# Patient Record
Sex: FEMALE | HISPANIC OR LATINO | Employment: UNEMPLOYED | URBAN - METROPOLITAN AREA
[De-identification: names, ages, dates, MRNs, and addresses within clinical notes are randomized per-mention and may not be internally consistent; named-entity substitution may affect disease eponyms.]

---

## 2024-05-09 ENCOUNTER — PRE-ADMISSION TESTING (OUTPATIENT)
Dept: ADMISSIONS | Facility: MEDICAL CENTER | Age: 54
End: 2024-05-09
Attending: SPECIALIST
Payer: COMMERCIAL

## 2024-05-09 NOTE — OR NURSING
Pt understands to not take supplements, herbals, or multivitamins for 1 week prior to procedure, and if she needs to take anything for pain OTC, Tylenol or Extra Strength Tylenol is the only NSAID to take.  Please do HCG DOS.

## 2024-05-13 ENCOUNTER — ANESTHESIA (OUTPATIENT)
Dept: SURGERY | Facility: MEDICAL CENTER | Age: 54
End: 2024-05-13
Payer: COMMERCIAL

## 2024-05-13 ENCOUNTER — HOSPITAL ENCOUNTER (OUTPATIENT)
Facility: MEDICAL CENTER | Age: 54
End: 2024-05-13
Attending: SPECIALIST | Admitting: SPECIALIST
Payer: COMMERCIAL

## 2024-05-13 ENCOUNTER — ANESTHESIA EVENT (OUTPATIENT)
Dept: SURGERY | Facility: MEDICAL CENTER | Age: 54
End: 2024-05-13
Payer: COMMERCIAL

## 2024-05-13 VITALS
TEMPERATURE: 96.9 F | RESPIRATION RATE: 13 BRPM | BODY MASS INDEX: 30.55 KG/M2 | DIASTOLIC BLOOD PRESSURE: 74 MMHG | HEART RATE: 72 BPM | WEIGHT: 161.82 LBS | SYSTOLIC BLOOD PRESSURE: 130 MMHG | OXYGEN SATURATION: 93 % | HEIGHT: 61 IN

## 2024-05-13 DIAGNOSIS — G89.18 POSTOPERATIVE PAIN: ICD-10-CM

## 2024-05-13 PROBLEM — N84.0 ABNORMAL UTERINE BLEEDING DUE TO ENDOMETRIAL POLYP: Status: ACTIVE | Noted: 2024-05-13

## 2024-05-13 PROBLEM — N93.9 ABNORMAL UTERINE BLEEDING DUE TO ENDOMETRIAL POLYP: Status: ACTIVE | Noted: 2024-05-13

## 2024-05-13 PROBLEM — N92.0 MENORRHAGIA: Status: ACTIVE | Noted: 2024-05-13

## 2024-05-13 LAB
HCG UR QL: NEGATIVE
PATHOLOGY CONSULT NOTE: NORMAL

## 2024-05-13 RX ORDER — DEXAMETHASONE SODIUM PHOSPHATE 4 MG/ML
INJECTION, SOLUTION INTRA-ARTICULAR; INTRALESIONAL; INTRAMUSCULAR; INTRAVENOUS; SOFT TISSUE PRN
Status: DISCONTINUED | OUTPATIENT
Start: 2024-05-13 | End: 2024-05-13 | Stop reason: SURG

## 2024-05-13 RX ORDER — OXYCODONE HCL 5 MG/5 ML
5 SOLUTION, ORAL ORAL
Status: COMPLETED | OUTPATIENT
Start: 2024-05-13 | End: 2024-05-13

## 2024-05-13 RX ORDER — OXYCODONE HCL 5 MG/5 ML
10 SOLUTION, ORAL ORAL
Status: COMPLETED | OUTPATIENT
Start: 2024-05-13 | End: 2024-05-13

## 2024-05-13 RX ORDER — LIDOCAINE HYDROCHLORIDE 20 MG/ML
INJECTION, SOLUTION EPIDURAL; INFILTRATION; INTRACAUDAL; PERINEURAL PRN
Status: DISCONTINUED | OUTPATIENT
Start: 2024-05-13 | End: 2024-05-13 | Stop reason: SURG

## 2024-05-13 RX ORDER — ONDANSETRON 2 MG/ML
4 INJECTION INTRAMUSCULAR; INTRAVENOUS
Status: COMPLETED | OUTPATIENT
Start: 2024-05-13 | End: 2024-05-13

## 2024-05-13 RX ORDER — CEFAZOLIN SODIUM 1 G/3ML
INJECTION, POWDER, FOR SOLUTION INTRAMUSCULAR; INTRAVENOUS PRN
Status: DISCONTINUED | OUTPATIENT
Start: 2024-05-13 | End: 2024-05-13 | Stop reason: SURG

## 2024-05-13 RX ORDER — DIPHENHYDRAMINE HYDROCHLORIDE 50 MG/ML
12.5 INJECTION INTRAMUSCULAR; INTRAVENOUS
Status: DISCONTINUED | OUTPATIENT
Start: 2024-05-13 | End: 2024-05-13 | Stop reason: HOSPADM

## 2024-05-13 RX ORDER — SODIUM CHLORIDE, SODIUM LACTATE, POTASSIUM CHLORIDE, CALCIUM CHLORIDE 600; 310; 30; 20 MG/100ML; MG/100ML; MG/100ML; MG/100ML
INJECTION, SOLUTION INTRAVENOUS CONTINUOUS
Status: ACTIVE | OUTPATIENT
Start: 2024-05-13 | End: 2024-05-13

## 2024-05-13 RX ORDER — MEPERIDINE HYDROCHLORIDE 25 MG/ML
6.25 INJECTION INTRAMUSCULAR; INTRAVENOUS; SUBCUTANEOUS
Status: DISCONTINUED | OUTPATIENT
Start: 2024-05-13 | End: 2024-05-13 | Stop reason: HOSPADM

## 2024-05-13 RX ORDER — OXYCODONE HYDROCHLORIDE AND ACETAMINOPHEN 5; 325 MG/1; MG/1
1 TABLET ORAL EVERY 6 HOURS PRN
Qty: 28 TABLET | Refills: 0 | Status: SHIPPED | OUTPATIENT
Start: 2024-05-13 | End: 2024-05-20

## 2024-05-13 RX ORDER — EPINEPHRINE 1 MG/ML(1)
AMPUL (ML) INJECTION
Status: DISCONTINUED
Start: 2024-05-13 | End: 2024-05-13 | Stop reason: HOSPADM

## 2024-05-13 RX ORDER — HALOPERIDOL 5 MG/ML
1 INJECTION INTRAMUSCULAR
Status: DISCONTINUED | OUTPATIENT
Start: 2024-05-13 | End: 2024-05-13 | Stop reason: HOSPADM

## 2024-05-13 RX ORDER — ONDANSETRON 2 MG/ML
INJECTION INTRAMUSCULAR; INTRAVENOUS PRN
Status: DISCONTINUED | OUTPATIENT
Start: 2024-05-13 | End: 2024-05-13 | Stop reason: SURG

## 2024-05-13 RX ORDER — KETOROLAC TROMETHAMINE 15 MG/ML
INJECTION, SOLUTION INTRAMUSCULAR; INTRAVENOUS PRN
Status: DISCONTINUED | OUTPATIENT
Start: 2024-05-13 | End: 2024-05-13 | Stop reason: SURG

## 2024-05-13 RX ORDER — EPHEDRINE SULFATE 50 MG/ML
INJECTION, SOLUTION INTRAVENOUS PRN
Status: DISCONTINUED | OUTPATIENT
Start: 2024-05-13 | End: 2024-05-13 | Stop reason: SURG

## 2024-05-13 RX ORDER — IBUPROFEN 800 MG/1
800 TABLET ORAL EVERY 8 HOURS PRN
Qty: 30 TABLET | Refills: 0 | Status: SHIPPED | OUTPATIENT
Start: 2024-05-13

## 2024-05-13 RX ORDER — BUPIVACAINE HYDROCHLORIDE 2.5 MG/ML
INJECTION, SOLUTION EPIDURAL; INFILTRATION; INTRACAUDAL
Status: DISCONTINUED
Start: 2024-05-13 | End: 2024-05-13 | Stop reason: HOSPADM

## 2024-05-13 RX ORDER — SODIUM CHLORIDE, SODIUM LACTATE, POTASSIUM CHLORIDE, CALCIUM CHLORIDE 600; 310; 30; 20 MG/100ML; MG/100ML; MG/100ML; MG/100ML
INJECTION, SOLUTION INTRAVENOUS CONTINUOUS
Status: DISCONTINUED | OUTPATIENT
Start: 2024-05-13 | End: 2024-05-13 | Stop reason: HOSPADM

## 2024-05-13 RX ADMIN — DEXAMETHASONE SODIUM PHOSPHATE 4 MG: 4 INJECTION INTRA-ARTICULAR; INTRALESIONAL; INTRAMUSCULAR; INTRAVENOUS; SOFT TISSUE at 14:58

## 2024-05-13 RX ADMIN — CEFAZOLIN 2 G: 1 INJECTION, POWDER, FOR SOLUTION INTRAMUSCULAR; INTRAVENOUS at 13:58

## 2024-05-13 RX ADMIN — OXYCODONE HYDROCHLORIDE 5 MG: 5 SOLUTION ORAL at 15:45

## 2024-05-13 RX ADMIN — EPHEDRINE SULFATE 10 MG: 50 INJECTION, SOLUTION INTRAVENOUS at 14:23

## 2024-05-13 RX ADMIN — FENTANYL CITRATE 50 MCG: 50 INJECTION, SOLUTION INTRAMUSCULAR; INTRAVENOUS at 15:01

## 2024-05-13 RX ADMIN — LIDOCAINE HYDROCHLORIDE 100 MG: 20 INJECTION, SOLUTION EPIDURAL; INFILTRATION; INTRACAUDAL at 13:52

## 2024-05-13 RX ADMIN — ONDANSETRON 4 MG: 2 INJECTION INTRAMUSCULAR; INTRAVENOUS at 14:58

## 2024-05-13 RX ADMIN — SODIUM CHLORIDE, POTASSIUM CHLORIDE, SODIUM LACTATE AND CALCIUM CHLORIDE: 600; 310; 30; 20 INJECTION, SOLUTION INTRAVENOUS at 10:43

## 2024-05-13 RX ADMIN — ONDANSETRON 4 MG: 2 INJECTION INTRAMUSCULAR; INTRAVENOUS at 15:02

## 2024-05-13 RX ADMIN — PROPOFOL 200 MG: 10 INJECTION, EMULSION INTRAVENOUS at 13:52

## 2024-05-13 RX ADMIN — KETOROLAC TROMETHAMINE 15 MG: 15 INJECTION, SOLUTION INTRAMUSCULAR; INTRAVENOUS at 14:58

## 2024-05-13 ASSESSMENT — PAIN DESCRIPTION - PAIN TYPE: TYPE: SURGICAL PAIN

## 2024-05-13 NOTE — OP REPORT
DATE OF SERVICE:  05/13/2024     PREOPERATIVE DIAGNOSES:    1.  Menometrorrhagia/postmenopausal bleeding.  2.  Ultrasound does reveal slight uterine enlargement.     POSTOPERATIVE DIAGNOSES:    1.  Menometrorrhagia/postmenopausal bleeding.  2.  Ultrasound does reveal slight uterine enlargement.  3.  During hysteroscopies, 2 small endometrial polyps are seen.     PROCEDURES:  Hysteroscopy, D and C, hydrothermal endometrial ablation.     SURGEON:  Danish Jordan MD     ANESTHESIA:  General anesthesia.     ANESTHESIOLOGIST:  Jude Batista MD     FINDINGS:  Bimanual exam under anesthesia reveals no evidence of any   significant uterine enlargement and no adnexal masses either on the right or   left.  Exam reveals that the uterus was easily movable.     Speculum exam under anesthesia reveals no vulvar or vaginal or cervical   lesions.  The cervix was well visualized and appears multiparous and there is   some ectopy noted when inspecting the cervix.  The vulva and vaginal mucosa   are slightly atrophic.     During hysteroscopy, excellent views of the intrauterine cavity were obtained.    During hysteroscopy, both tubal ostia were clearly identified. During   hysteroscopy, 2 very small endometrial polyps are seen emanating from the   right lateral/posterior aspect of the intrauterine cavity.  When hysteroscopy   was repeated following intrauterine curettage, one of these polyps is no   longer present and the other is smaller.     During hydrothermal endometrial ablation, the entire intrauterine cavity is   found to become nicely and thoroughly ablated including the remnant of   endometrial polyp.     SPECIMENS:  Endometrial curettings.     COMPLICATIONS:  None.     ESTIMATED BLOOD LOSS:  Less than 20 mL.     DESCRIPTION OF PROCEDURE:  After the appropriate consents have been obtained,   the patient was taken to the operating room and given general anesthesia.  She   was prepped and draped in the dorsal lithotomy  position.  The bladder was   emptied with a catheter.  After the patient was given general anesthesia, but   before she was prepped and draped in the dorsal lithotomy position, a bimanual   vaginal exam was performed with the patient in the dorsal lithotomy position.     After the patient was prepped and draped in the dorsal lithotomy position and   the bladder was emptied with a catheter, speculum exam was performed that   reveals no vulvar or vaginal or cervical lesions and no cervical or vaginal   discharge.  The cervix was well visualized and is multiparous and some ectopy   is seen when examining the cervix.  The vulva and vaginal mucosa are slightly   atrophic.  A single tooth tenaculum was applied to the anterior aspect of the   cervix and then the cervix was dilated with Hanks and then Hegar dilators and   was dilated to a Hegar #8.  The hysteroscope was inserted through the   endocervical canal into the intrauterine cavity. Hysteroscopy was performed   and excellent views of the intrauterine cavity were obtained during   hysteroscopy and findings are as noted above and pictures were taken.  The   hysteroscope was removed and a sharp curette was inserted through the   endocervical canal into the intrauterine cavity and all 4 quadrants of the   intrauterine cavity were thoroughly curetted and curettings are submitted on a   Telfa pad.  The sharp curette was removed and the hysteroscope was reinserted   in the intrauterine cavity and hysteroscopy was repeated and when   hysteroscopy was repeated following intrauterine curettage, one of the 2   polyps that had been seen was no longer present and some of the substance of   the other polyp is found to have been removed, but there is still some remnant   of polyp.  Pictures were taken.  Then, hydrothermal endometrial ablation was   performed.  After the usual 90 second warmup period, hydrothermal endometrial   ablation was continued for 10 minutes and during this  10-minute hydrothermal   endometrial ablation, the entire intrauterine cavity was found to become   nicely and thoroughly ablated.  Then, after the usual 90 second cool down   period, hysteroscopy was continued and when hysteroscopy was continued   following hydrothermal endometrial ablation, the entire intrauterine cavity   was found to have become nicely and thoroughly ablated including the remnant   of endometrial polyp.  Pictures were taken.  The hysteroscope was removed.    The single tooth tenaculum was removed from the cervix.  Some bleeding is seen   coming from the site of attachment of the single tooth tenaculum.  Pressure   was placed against the cervix with a gauze sponge for 5 minutes.  The gauze   sponge was removed and the cervix was reexamined and at this time, no bleeding   was seen coming from the cervix, either from the site of attachment of the   single tooth tenaculum or from the external cervical os. The speculum was then   removed.  The procedure was terminated and the patient tolerated the   procedure well and sent to postanesthesia recovery in stable condition.        ______________________________  Danish Jordan MD    MED/NORI    DD:  05/13/2024 15:06  DT:  05/13/2024 15:57    Job#:  [id number]

## 2024-05-13 NOTE — DISCHARGE INSTRUCTIONS
If any questions arise, call your provider.  If your provider is not available, please feel free to call the Surgical Center at [phone].    MEDICATIONS: Resume taking daily medication.  Take prescribed pain medication with food.  If no medication is prescribed, you may take non-aspirin pain medication if needed.  PAIN MEDICATION CAN BE VERY CONSTIPATING.  Take a stool softener or laxative such as senokot, pericolace, or milk of magnesia if needed.    Last pain medication given at 3:45 pm    Your prescriptions were sent in to [facility] on [address]. Two medications, both for pain.    What to Expect Post Anesthesia    Rest and take it easy for the first 24 hours.  A responsible adult is recommended to remain with you during that time.  It is normal to feel sleepy.  We encourage you to not do anything that requires balance, judgment or coordination.    FOR 24 HOURS DO NOT:  Drive, operate machinery or run household appliances.  Drink beer or alcoholic beverages.  Make important decisions or sign legal documents.    To avoid nausea, slowly advance diet as tolerated, avoiding spicy or greasy foods for the first day.  Add more substantial food to your diet according to your provider's instructions.  Babies can be fed formula or breast milk as soon as they are hungry.  INCREASE FLUIDS AND FIBER TO AVOID CONSTIPATION.    MILD FLU-LIKE SYMPTOMS ARE NORMAL.  YOU MAY EXPERIENCE GENERALIZED MUSCLE ACHES, THROAT IRRITATION, HEADACHE AND/OR SOME NAUSEA.    Diet    Resume your normal diet as tolerated.  A diet low in cholesterol, fat, and sodium is recommended for good health.     Avoid Alcohol Use    Excessive alcohol use can cause poor nutrition and a lack of nutrients  which can lead to more health problems.  The best way to avoid malnutrition is to avoid alcohol.  If you must drink, limit your intake to no more than 1 drink a day for nonpregnant women and 2 drinks a day for men. One drink equals 12 oz (355 mL) of  beer, 5 oz (148 mL) of wine, or 1½ oz (44 mL) of hard liquor.    Activity    Resume Your Normal Activity    You may resume your normal activity as tolerated.  Rest as needed.        Pelvic Rest    No intercourse, tampons or douching for 2 weeks.  Instrucciones Para [place]  (Home Care Instructions)    ACTIVIDAD: Descanse y tome todo con mucha calma las primeras 24 horas después de urena cirugía.  Elida persona adulta responsable debe permanecer con usted david jian periodo de tiempo.  Es normal sentirse sonoliento o sonolienta david esas primeras horas.  Le recomendamos que no francisco nada que requiera equilibrio, payal decisiones a mucha coordinación de urena parte.    NO FRANCISCO ESTO PURANTE LAS PRIMERAS 24 HORAS:   Manejar o conducir algún vehiculo, operar maquinarias o utilizar electrodomesticos.   Beber cerveza o algún otro tipo de bebida alcohólica.   Payal decisiones importantes o firmar documentos legales.    INSTRUCCIONES ESPECIALES:    DIETA: Para evitar las nauseas, prosiga despacito con urena dieta a medida que pueda ir tolerándola mejor, evite comidas muy condimentadas o grasosas david jian primer día.  Vaya agregando comidas más substanciadas a urena dieta a medida que asi lo indique urena médica.  Los bebés pueden beber leche preparada o formula, ásl tio también leche del seno de la madre a medida que vayan teniendo hambre.  SIGA AGREGANDO LIQUIDOS Y COMIDAS CON FIBRA PARA EVITAR ESTREÑIMIEN  TIO BAÑARSE Y CAMBIAR LOS VENDAJES DE LA CIRUGIA:     MEDICAMENTOS/MEDICINAS:  Vuelva a payal juliana medicamentos diarios.  Mariano Colon los medicamentos que se le prescribe con un poco de comida.  Si no le prescribe ningún tipo de medicamento, entonces puede payal medicinas para el dolor que no contienen aspirina, si las necesita.  LAS MEDICINAS PARA EL DOLOR PUEDEN ESTREÑIRLE MUCHO.  Mariano Colon un suavizante para el excremento o materia fecal (stool softener) o un laxativo tio por ejemplo: senokot, pericolase, o leche de magnesia, si lo  necesita.    La prescripción la administro [facility] on [address]  La ultima sosis de medicina para el dolor fue administrada 3:10 pm.     Se debe hacer josué consulta medica con el doctor, Líame para hacer la mariama.    Usted debe LIAMAR A URENA MEDICO si tiene los siguientes síntomas:   -   Josué fiebre más jefry de 101 grados Fahrenheit.   -   Un dolor incesante aún con los medicamentos, o nauseas y vómito persistente.   -   Un sangrado excesivo (nova que traspasa los vendajes o gasas) o algúln tipo de drenaje inesperado que proviene de la henda.     -   Un color [place] exagerado o hinchazón alrededor del área en donde se le hizo incisión o wayne, o un drenaje de pus o con olor bandar proveniente de la henda.   -    La inhabilidad de orinar o vaciar urena vejiga en 8 horas.   -    Problemas con a respiración o janes en el pecho.    Usted debe llamar al 911 si se presentan problemas con el dolor al respirar o el pecho.  Si no se puede ponnoer en comunicación con un medica o con el centro de cirugía, usted debe ir a la estación de emergencia (emergency room) más cercana o a un centro de atención de urgencia (urgent care center).  El teléfono del medico es: [phone]    LOS SÍNTOMAS DE UN LEVE RESFRIO SON MUY NORMALES.  ADEMÁS USTED PUEDE LLEGAR A SENTIR JANES GENERALES DE MÚSCULOS, IRRITACIÓN EN LA GARGANTA, JANES DE UMM Y/O UN POCO DE NAUSEAS.    Sie tiene alguna pregunta, llame a urena médico.  Si urena médico no se encuentra disponible, por favor llame al Centro de Cirugía at [phone].  el Centro está abierto de Lunes a Viernes desde las 7:00 de la manana hasta las 5:00 de la noche.      Mi firma a continuación indica que he recibido y entiendco estas instrucciones acera de los cuidados en la casa (Home Care Instructions)    Usted recibirá josué encuesta en la correspondencia en las siguientes semanas y le pedimos que por favor tome un momento para completar tesfaye encuesta y regresaría a hosotros.  Nuestro objetivó es  brindarle un cuidado muy najera y par lo tanto apreciamos juliana coméntanos.  Muchas jason por silva escogido el [facility] de [facility].

## 2024-05-13 NOTE — ANESTHESIA POSTPROCEDURE EVALUATION
Patient: Kristen Jimenez    Procedure Summary       Date: 05/13/24 Room / Location: [facility] ROOM 23 / SURGERY SAME DAY [facility]    Anesthesia Start: 1352 Anesthesia Stop: 1459    Procedures:       HYSTEROSCOPY, DILATION AND CURETTAGE, HYDROTHERMAL ENDOMETRIAL ABLATION (Uterus)      DILATION AND CURETTAGE (Uterus) Diagnosis: (MENOMETRORRHAGIA, POST-MENOPAUSAL BLEEDING, ENDOMETRIAL POLYP)    Surgeons: Danish Jordan M.D. Responsible Provider: Jude Batista M.D.    Anesthesia Type: general ASA Status: 2            Final Anesthesia Type: general  Last vitals  BP   Blood Pressure: 139/73    Temp   36.2 °C (97.2 °F)    Pulse   (!) 59   Resp   18    SpO2   99 %      Anesthesia Post Evaluation    Patient location during evaluation: PACU  Patient participation: complete - patient participated  Level of consciousness: awake and alert    Airway patency: patent  Anesthetic complications: no  Cardiovascular status: hemodynamically stable  Respiratory status: acceptable  Hydration status: euvolemic    PONV: none          There were no known notable events for this encounter.     Nurse Pain Score: 0 (NPRS)

## 2024-05-13 NOTE — ANESTHESIA TIME REPORT
Anesthesia Start and Stop Event Times       Date Time Event    5/13/2024 1341 Ready for Procedure     1352 Anesthesia Start     1459 Anesthesia Stop          Responsible Staff  05/13/24      Name Role Begin End    Jude Batista M.D. Anesth 1351 0693          Overtime Reason:  no overtime (within assigned shift)    Comments:

## 2024-05-13 NOTE — ANESTHESIA PROCEDURE NOTES
Airway    Date/Time: 5/13/2024 1:57 PM    Performed by: Jude Batista M.D.  Authorized by: Jude Batista M.D.    Location:  OR  Urgency:  Elective  Indications for Airway Management:  Anesthesia      Spontaneous Ventilation: absent    Sedation Level:  Deep  Preoxygenated: Yes    Final Airway Type:  Supraglottic airway  Final Supraglottic Airway:  Standard LMA    SGA Size:  3  Number of Attempts at Approach:  1

## 2024-05-13 NOTE — OR NURSING
Discharge instructions reviewed with daughter in English.  Discharge instructions reviewed with pt and daughter in Irish.

## 2024-05-13 NOTE — H&P
Kristen Jimenez  YOB: 1970  Date of today's admission/procedure: Monday, May 13, 2024  Facility: [facility]    ID: The patient is a very pleasant 53-year-old postmenopausal multipara (she has had 2 vaginal deliveries followed by 1  section).    Chief complaint: The patient complains of postmenopausal bleeding.    History of present illness:  The patient has been experiencing menorrhagia/postmenopausal bleeding and imaging has revealed uterine enlargement. Ultrasound performed at [facility] revealed according to the records that the uterus measured about 9.6 centimeters by 5.5 centimeters by 6.9 centimeters and therefore is somewhat enlarged. Ultrasound also revealed that the endometrial stripe was 5.2 millimeters in thickness and that there was a 2.0 by 1.8 centimeters right ovarian cyst and the left ovary was not seen and according to the report the uterus was somewhat heterogeneous in echotexture. The patient is scheduled today to undergo hysteroscopy, D&C, and possible hydrothermal endometrial ablation. Of note, her Pap smear from May 3, 2024 (10 days ago) came back according to the report from [facility] as satisfactory for evaluation and negative for any intraepithelial lesion or malignancy.    Past medical history:  The patient says she has no medical illnesses.    Past surgical history: The patient says she has undergone umbilical hernia repair and that she has also undergone  section.    Medications:  The patient says she takes no medications other than for vitamins.    Allergies: The patient says she has no known drug allergies.    Social history: The patient denies smoking. She denies consuming alcoholic beverages. She denies using recreational drugs.    Review of Systems:    General: The patient denies any fevers or chills or sweats.   Pulmonary: The patient denies any coughing or wheezing or chest pain or  shortness of breath.  Cardiovascular: The patient denies any palpitations, chest pain, dyspnea.  Gastrointestinal: The patient denies any nausea, vomiting, diarrhea, constipation, hematochezia, melena.  Genitourinary: The patient has experienced postmenopausal bleeding recently.  Musculoskeletal: The patient denies any arthralgias or myalgias.  Neurological: The patient denies any headaches or syncope or seizures.      Physical Exam:    Vital signs: The patient's vital signs are stable and she is afebrile.  General: The patient appears well developed and well-nourished and relaxed and alert and comfortable and in no apparent distress.  HEENT: Normocephalic, atraumatic, pupils equal, round, reactive to light and accommodation, extra ocular motions intact, pharynx clear.  There is no thyromegaly.  There is no cervical lymphadenopathy.  Chest: Heart regular rate and rhythm, with no murmurs or rubs or gallops.  The lungs are clear to auscultation bilaterally.  Abdomen: Examination of the patient's abdomen today with the patient in the dorsal supine position reveals that the abdomen is soft and nontender and nondistended and that there is no evidence of hepatomegaly and that there is no evidence of splenomegaly.  There is no evidence detected on today's abdominal exam of any abdominal masses. There is a small midline vertical supraumbilical surgical scar. There is a Pfannenstiel scar.  Speculum exam: Speculum exam performed with the patient in the dorsal lithotomy position reveals that there are no vulvar or vaginal or cervical lesions and that there is no cervical or vaginal discharge.  The vulva and vaginal mucosa appear somewhat atrophic.    Bimanual exam: With the patient still in the dorsal lithotomy position I performed a bimanual vaginal exam, and bimanual vaginal exam reveals no evidence of cervical motion tenderness and no evidence of tenderness to palpation of the uterine corpus and no evidence of uterine  enlargement and no evidence of any adnexal masses or tenderness either on the right or the left.  Extremities: No clubbing or cyanosis or edema.  Neurological: Nonfocal.           Assessment:   Menometrorrhagia/postmenopausal bleeding.  Ultrasound does reveal slight uterine enlargement.    Plan:   We will proceed today with hysteroscopy and D&C and possible hydrothermal endometrial ablation. I have discussed with the patient and explained to the patient in detail and at length what hysteroscopy, D&C, and hydrothermal endometrial ablation is and what hysteroscopy, D&C, and hydrothermal endometrial ablation involves and I have discussed with her and explained to her in detail and at length the risks and benefits and alternatives of hysteroscopy, D&C, and hydrothermal endometrial ablation. After our discussions today and after answering her questions she told me that she would like to proceed with hysteroscopy and D&C and possible hydrothermal endometrial ablation.        __________________  Danish Jordan M.D.

## 2024-05-13 NOTE — OR NURSING
Ambulated to BR with assist. Gait steady..  Voided large amount of clear yellow urine.Peripad dry, but changed bc getting ready to go home. Surgical panties given to pt.

## 2024-05-13 NOTE — ANESTHESIA PREPROCEDURE EVALUATION
Case: [id number] Date/Time: 05/13/24 1145    Procedures:       HYSTEROSCOPY, DILATION AND CURETTAGE, POSSIBLE HYDROTHERMAL ENDOMETRIAL ABLATION      DILATION AND CURETTAGE    Pre-op diagnosis: MENOMETRORRHAGIA, POST-MENOPAUSAL BLEEDING    Location: [facility] ROOM 23 / SURGERY SAME DAY [facility]    Surgeons: Danish Jordan M.D.            Relevant Problems   No relevant active problems       Physical Exam    Airway   Mallampati: II  TM distance: >3 FB  Neck ROM: full       Cardiovascular - normal exam  Rhythm: regular  Rate: normal  (-) murmur     Dental - normal exam           Pulmonary - normal exam  Breath sounds clear to auscultation     Abdominal    Neurological - normal exam                   Anesthesia Plan    ASA 2       Plan - general       Airway plan will be LMA          Induction: intravenous    Postoperative Plan: Postoperative administration of opioids is intended.    Pertinent diagnostic labs and testing reviewed    Informed Consent:    Anesthetic plan and risks discussed with patient.    Use of blood products discussed with: patient whom consented to blood products.

## 2024-05-13 NOTE — OR SURGEON
Immediate Post OP Note    PreOp Diagnosis:   Menometrorrhagia/postmenopausal bleeding.  Ultrasound does reveal slight uterine enlargement.    PostOp Diagnosis:   Menometrorrhagia/postmenopausal bleeding.  Ultrasound does reveal slight uterine enlargement.  During hysteroscopy 2 small endometrial polyps were seen.    Procedure(s):  HYSTEROSCOPY, DILATION AND CURETTAGE, HYDROTHERMAL ENDOMETRIAL ABLATION - Wound Class: Clean Contaminated  DILATION AND CURETTAGE - Wound Class: Clean Contaminated    Surgeon(s):  Danish Jordan M.D.    Anesthesiologist/Type of Anesthesia:  Anesthesiologist: Jude Batista M.D./General    Surgical Staff:  Circulator: Jay Smith RFADUMO  Relief Circulator: Val Dhaliwal R.N.  Relief Scrub: Cameron Chávez  Scrub Person: Sue Sawant    Specimens removed if any:  ID Type Source Tests Collected by Time Destination   A : ENDOMETRIAL CURETTINGS Other Other PATHOLOGY SPECIMEN Danish Jordan M.D. 5/13/2024  2:22 PM        Estimated Blood Loss:   Less than 20 cc.    Findings:   Bimanual exam under anesthesia reveals no evidence of any significant uterine enlargement and no adnexal masses either on the right or the left and reveals that the uterus is easily mobilized.  Speculum exam under anesthesia reveals no vulvar or vaginal or cervical lesions and the cervix is well-visualized and is multiparous and there is some ectopy noted when examining the cervix.  The vulva and vaginal mucosa are slightly atrophic.  During hysteroscopy excellent views of the intrauterine cavity are obtained.  During hysteroscopy both tubal ostia are clearly identified.  During hysteroscopy 2 very small endometrial polyps are seen emanating from the right lateral/posterior aspects of the intrauterine cavity.  When hysteroscopy was repeated following intrauterine curettage 1 of these polyps is no longer present and the other is smaller.  During hydrothermal endometrial ablation the entire intrauterine  cavity is found to become nicely and thoroughly ablated included the remnant of endometrial polyp.    Complications:   None.        5/13/2024 2:48 PM Danish Jordan M.D.

## 2024-08-14 ENCOUNTER — OFFICE VISIT (OUTPATIENT)
Dept: OBGYN | Facility: CLINIC | Age: 54
End: 2024-08-14
Payer: COMMERCIAL

## 2024-08-14 VITALS
SYSTOLIC BLOOD PRESSURE: 118 MMHG | WEIGHT: 165.2 LBS | HEIGHT: 63 IN | BODY MASS INDEX: 29.27 KG/M2 | DIASTOLIC BLOOD PRESSURE: 76 MMHG

## 2024-08-14 DIAGNOSIS — Z80.49 FAMILY HISTORY OF UTERINE CANCER: ICD-10-CM

## 2024-08-14 DIAGNOSIS — Z80.42 FAMILY HISTORY OF PROSTATE CANCER: ICD-10-CM

## 2024-08-14 DIAGNOSIS — Z80.3 FAMILY HISTORY OF BREAST CANCER: ICD-10-CM

## 2024-08-14 DIAGNOSIS — Z84.81 FAMILY HISTORY OF BRCA GENE POSITIVE: ICD-10-CM

## 2024-08-14 DIAGNOSIS — Z80.0 FAMILY HISTORY OF COLON CANCER: ICD-10-CM

## 2024-08-14 DIAGNOSIS — Z87.42 HISTORY OF ABNORMAL CERVICAL PAP SMEAR: ICD-10-CM

## 2024-08-14 PROCEDURE — 3078F DIAST BP <80 MM HG: CPT | Performed by: OBSTETRICS & GYNECOLOGY

## 2024-08-14 PROCEDURE — 3074F SYST BP LT 130 MM HG: CPT | Performed by: OBSTETRICS & GYNECOLOGY

## 2024-08-14 PROCEDURE — 99204 OFFICE O/P NEW MOD 45 MIN: CPT | Performed by: OBSTETRICS & GYNECOLOGY

## 2024-08-14 RX ORDER — ATORVASTATIN CALCIUM 40 MG/1
40 TABLET, FILM COATED ORAL NIGHTLY
COMMUNITY

## 2024-08-14 NOTE — PROGRESS NOTES
New Gynecological Visit    Kristen Jimenez    53 y.o.    Chief complaint    No chief complaint on file.      HPI    Patient is a 54 yo  who presents from [facility] for a second opinion to see if hysterectomy would be needed for her history of HPV and abnormal pap smears. Language translation used for entire visit.   Patient describes being diagnosed with HPV approximately 2 years ago and having mild dysplasia. Had colposcopy in 2022, with biopsies which were benign. 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 Last year had ASCUS, HPV positive per records from [facility], however we do not have these reports. She was referred to a local gynecologist earlier this year who did repeat a pap in May which was normal per his documentation. She also had very heavy and irregular bleeding, and underwent a hysteroscopy, D & C , polypectomy at [facility] by Dr. Jordan on 24 with benign pathology.   She subsequently had been to [place] in July and had cervical biopsies for unknown reasons on 24 and she does have results on her phone demonstrating PINEDA I. She was told by the doctor in [place] that she should consider hysterectomy due to her family history of cancers and persistent HPV.   She reports no pelvic pain, vaginal bleeding since her endometrial ablation or changes with bowel/bladder.     She does have an extensive family history of cancers. Had one sister with uterine cancer diagnosed at age 57, two sisters with breast cancers diagnosed at age 45 and 50, a brother with prostate cancer, maternal uncle with colon cancer, maternal aunt with breast cancer, father with leukemia, and mother had skin cancer.   She also reports one of her nieces undergoing prophylactic mastectomy, and thinks she may have tested positive for a genetic mutation but is not sure what test she underwent.     Patient is up to date with colon cancer and breast cancer screening. Colonoscopy last year was normal and mammography this year in February was  normal.         Review of Systems:  Review of Systems   All other systems reviewed and are negative.       Past Obstetrical History:    C/S x 1   x 2    Past Gynecological History:    Last pap: 2024 normal per Dr. Jordan' documentation on 24  H/o abnormal pap: Yes, as above  H/o STIs: Hx HPV  DEXA: N/A  Last Mammogram: 2024 normal per patient  Colonoscopy: normal  per patient  LMP: No LMP recorded. Patient has had an ablation.  BCM: Ablation    Past Medical History    Past Medical History:   Diagnosis Date    Hyperlipidemia     Pain 2024    right ovarian pain       Past Surgical History    Past Surgical History:   Procedure Laterality Date    AK HYSTEROSCOPY,W/ENDOMETRIAL ABLATION N/A 2024    Procedure: HYSTEROSCOPY, DILATION AND CURETTAGE, HYDROTHERMAL ENDOMETRIAL ABLATION;  Surgeon: Danish Jordan M.D.;  Location: SURGERY SAME DAY [facility];  Service: Gynecology    DILATION AND CURETTAGE N/A 2024    Procedure: DILATION AND CURETTAGE;  Surgeon: Danish Jordan M.D.;  Location: SURGERY SAME DAY [facility];  Service: Gynecology    PELVISCOPY  11/10/2008    Performed by ARNULFO PETERSON at SURGERY SAME DAY [facility]    LYSIS ADHESIONS GYN  11/10/2008    Performed by ARNULFO PETERSON at SURGERY SAME DAY [facility]    PRIMARY C SECTION  08/15/1998    Done at [facility] by Dr. Peterson    OTHER Bilateral     salpingectomy       Family History   Problem Relation Age of Onset    Other Mother         Covid    Leukemia Father        Allergies    No Known Allergies    Medications    Current Outpatient Medications   Medication Sig Dispense Refill    atorvastatin (LIPITOR) 40 MG Tab Take 40 mg by mouth every evening.      Ascorbic Acid (VITAMIN C PO) Take  by mouth every day.      VITAMIN E PO Take  by mouth every day.      CALCIUM PO Take  by mouth every day.      MAGNESIUM PO Take  by mouth every day.      ibuprofen (MOTRIN) 800 MG Tab Take 1 Tablet by mouth every 8 hours  "as needed for Mild Pain or Moderate Pain. (Patient not taking: Reported on 2024) 30 Tablet 0     No current facility-administered medications for this visit.       Social  Social History     Tobacco Use    Smoking status: Never    Smokeless tobacco: Never   Vaping Use    Vaping status: Never Used   Substance Use Topics    Alcohol use: Never    Drug use: Never        OBJECTIVE:    Vitals    /76 (BP Location: Right arm, Patient Position: Sitting, BP Cuff Size: Large adult)   Ht 5' 3\"   Wt 165 lb 3.2 oz   BMI 29.26 kg/m²     Physical Exam    GENERAL: Well developed, well nourished, female in no acute distress.    Exam deferred    Labs/Pathology:    24  SURGICAL PATHOLOGY CONSULTATION       FINAL DIAGNOSIS:     A. Endometrial curettings:          Fragment of endometrial tissue with features suggestive of           benign endometrial polyp (see comment).   Comment: Background architecturally intact non-polyp endometrial tissue   is not present for evaluation.   A/P    Patient Active Problem List   Diagnosis    Menorrhagia    Abnormal uterine bleeding due to endometrial polyp       Kristen Jimenez    53 y.o.        1. History of abnormal cervical Pap smear    2. Family history of breast cancer    3. Family history of prostate cancer    4. Family history of uterine cancer    5. Family history of colon cancer    6. Family history of BRCA gene positive      Long discussion using language translation regarding her history of HPV. Explained to patient that HPV presence itself does not warrant hysterectomy unless there is high grade dysplasia present. We have no evidence of cervical biopsies showing high grade dysplasia. Per her records from [place], it appears colposcopy with cervical biopsies resulted with PINEDA I. Explained to patient that hysterectomy would not be indicated for mild cervical dysplasia. She is also not having any pelvic pain and/or recurrent bleeding since her ablation, which seemed to be " successful, so this would not warrant a hysterectomy. It is reassuring that the endometrial curetting's from her hysteroscopy was benign with polyp present.  Will have patient fill out records request for pap smear result from May at Dr. Jordan's office.     However, in reviewing patient's family history and her concern of family history of cancers, it is concerning. I discussed that cervical cancer and cervical dysplasia is not genetically passed down; it is mainly caused by HPV.   Patient with extensive first degree relative family history of breast, uterine, prostate cancer and second degree relatives with colon, breast cancer and a niece with possible mutation such as BRCA due to patient reporting her having a prophylactic mastectomy.     I highly recommended patient to undergo BRCA mutation and Dc syndrome testing. If she is positive for either of these mutations, she understands she would be referred to specialists such as gynecologic oncologist and genetic counseling to start down the path of aggressive surveillance and prophylactic surgeries such as hysterectomy, BSO, mastectomies, etc.     She understands the above discussion and would like to pursue genetic testing. She desires to check with her insurance for coverage of these tests before completing them.     RTC after testing complete.     Orders Placed This Encounter    BRCA1 AND BRCA2)SEQUENCING AND DELETION/DUPLICATION    [facility] DC (5)           Time spent: 45 minutes        River Shepherd M.D.    Obstetrics and Gynecology    8/14/20243:12 PM

## 2024-08-19 ENCOUNTER — HOSPITAL ENCOUNTER (OUTPATIENT)
Dept: LAB | Facility: MEDICAL CENTER | Age: 54
End: 2024-08-19
Attending: OBSTETRICS & GYNECOLOGY
Payer: COMMERCIAL

## 2024-08-19 DIAGNOSIS — Z80.49 FAMILY HISTORY OF UTERINE CANCER: ICD-10-CM

## 2024-08-19 DIAGNOSIS — Z84.81 FAMILY HISTORY OF BRCA GENE POSITIVE: ICD-10-CM

## 2024-08-19 DIAGNOSIS — Z80.3 FAMILY HISTORY OF BREAST CANCER: ICD-10-CM

## 2024-08-19 PROCEDURE — 36415 COLL VENOUS BLD VENIPUNCTURE: CPT

## 2024-09-03 LAB
Lab: NEGATIVE
Lab: NORMAL

## 2024-09-17 ENCOUNTER — TELEPHONE (OUTPATIENT)
Dept: OBGYN | Facility: CLINIC | Age: 54
End: 2024-09-17
Payer: COMMERCIAL

## 2024-09-17 DIAGNOSIS — Z80.3 FAMILY HISTORY OF BREAST CANCER: ICD-10-CM

## 2024-09-17 DIAGNOSIS — Z84.81 FAMILY HISTORY OF BRCA GENE POSITIVE: ICD-10-CM

## 2024-09-17 NOTE — TELEPHONE ENCOUNTER
Called [facility] lab requesting Pt's BRCA results,  stated they sent it to [facility] with the kit.   Called [facility] and spoke to Fatuma and stated they did not run the BRCA 1and 2 because the Dena-Ivanck panel was ordered online and the other was paper they can only can run one of the 2 panels because they are not able to run them together since they are 2 big panels. Fatuma stated pt will have to go back to the lab to get re-drawn and a new order is needed as well.   This info sent to Dr. Shepherd.     River Shepherd M.D.  Michelle Mensah R.N.  BRCA testing ordered. OK for patient to go to lab.    Thanks    River Shepherd M.D.    Obstetrics and Gynecology    9/17/2024 4:42 PM          09/18/24  0847 Called pt and informed [facility] testing came back negative but the BRCA1 and BRCA2 were not done. Explained to pt Dr. Shepherd place new orders for her to her these tests done. Explained to pt she needs to go to any [facility] to get these done. This RN apologized to pt. Pt agreed and verbalized understanding.

## 2024-12-12 ENCOUNTER — TELEPHONE (OUTPATIENT)
Dept: OBGYN | Facility: CLINIC | Age: 54
End: 2024-12-12
Payer: COMMERCIAL

## 2024-12-12 NOTE — TELEPHONE ENCOUNTER
Called [facility] to give them more ICD 10 codes for lab testing. Lab accepted the codes and will run test

## (undated) DEVICE — SODIUM CHL IRRIGATION 0.9% 1000ML (12EA/CA)

## (undated) DEVICE — PAD SANITARY 11IN MAXI IND WRAPPED  (12EA/PK 24PK/CA)

## (undated) DEVICE — DRAPE 36X28IN RAD CARM BND BG - (25/CA) O

## (undated) DEVICE — SUCTION INSTRUMENT YANKAUER BULBOUS TIP W/O VENT (50EA/CA)

## (undated) DEVICE — MASK OXYGEN VNYL ADLT MED CONC WITH 7 FOOT TUBING  - (50EA/CA)

## (undated) DEVICE — CANNULA O2 COMFORT SOFT EAR ADULT 7 FT TUBING (50/CA)

## (undated) DEVICE — SLEEVE VASO CALF MED - (10PR/CA)

## (undated) DEVICE — CANISTER SUCTION 3000ML MECHANICAL FILTER AUTO SHUTOFF MEDI-VAC NONSTERILE LF DISP  (40EA/CA)

## (undated) DEVICE — SET LEADWIRE 5 LEAD BEDSIDE DISPOSABLE ECG (1SET OF 5/EA)

## (undated) DEVICE — ELECTRODE DUAL RETURN W/ CORD - (50/PK)

## (undated) DEVICE — LACTATED RINGERS INJ 1000 ML - (14EA/CA 60CA/PF)

## (undated) DEVICE — Device

## (undated) DEVICE — KIT HTA GENESYS - (5/BX)

## (undated) DEVICE — WATER IRRIGATION STERILE 1000ML (12EA/CA)

## (undated) DEVICE — MASK AIRWAY SIZE 3 UNIQUE SILICON (10/BX)

## (undated) DEVICE — GOWN WARMING STANDARD FLEX - (30/CA)

## (undated) DEVICE — TUBING CLEARLINK DUO-VENT - C-FLO (48EA/CA)

## (undated) DEVICE — GLOVE BIOGEL SZ 7.5 SURGICAL PF LTX - (50PR/BX 4BX/CA)

## (undated) DEVICE — CANISTER SUCTION RIGID RED 1500CC (40EA/CA)

## (undated) DEVICE — TOWEL STOP TIMEOUT SAFETY FLAG (40EA/CA)

## (undated) DEVICE — KIT  I.V. START (100EA/CA)

## (undated) DEVICE — SODIUM CHL. IRRIGATION 0.9% 3000ML (4EA/CA 65CA/PF)

## (undated) DEVICE — SENSOR OXIMETER ADULT SPO2 RD SET (20EA/BX)

## (undated) DEVICE — TUBE CONNECTING SUCTION - CLEAR PLASTIC STERILE 72 IN (50EA/CA)